# Patient Record
Sex: MALE | Race: WHITE | NOT HISPANIC OR LATINO | Employment: OTHER | URBAN - METROPOLITAN AREA
[De-identification: names, ages, dates, MRNs, and addresses within clinical notes are randomized per-mention and may not be internally consistent; named-entity substitution may affect disease eponyms.]

---

## 2021-07-01 ENCOUNTER — OUTSIDE FACILITY SERVICE (OUTPATIENT)
Dept: CARDIOLOGY | Facility: CLINIC | Age: 61
End: 2021-07-01

## 2021-07-01 PROCEDURE — OUTSIDEPOS PR OUTSIDE POS PLACEHOLDER: Performed by: INTERNAL MEDICINE

## 2023-04-07 ENCOUNTER — OFFICE VISIT (OUTPATIENT)
Dept: VASCULAR SURGERY | Facility: HOSPITAL | Age: 63
End: 2023-04-07
Payer: OTHER GOVERNMENT

## 2023-04-07 VITALS
HEART RATE: 83 BPM | DIASTOLIC BLOOD PRESSURE: 60 MMHG | OXYGEN SATURATION: 96 % | RESPIRATION RATE: 18 BRPM | TEMPERATURE: 97.9 F | SYSTOLIC BLOOD PRESSURE: 92 MMHG

## 2023-04-07 DIAGNOSIS — I65.22 LEFT CAROTID ARTERY STENOSIS: ICD-10-CM

## 2023-04-07 DIAGNOSIS — I65.21 RIGHT CAROTID ARTERY OCCLUSION: Primary | ICD-10-CM

## 2023-04-07 PROCEDURE — G0463 HOSPITAL OUTPT CLINIC VISIT: HCPCS | Performed by: NURSE PRACTITIONER

## 2023-04-07 RX ORDER — ASPIRIN 81 MG/1
81 TABLET ORAL DAILY
COMMUNITY

## 2023-04-07 RX ORDER — LEVOTHYROXINE SODIUM 0.05 MG/1
50 TABLET ORAL DAILY
COMMUNITY

## 2023-04-07 RX ORDER — SIMVASTATIN 40 MG
40 TABLET ORAL DAILY
COMMUNITY

## 2023-04-07 NOTE — PROGRESS NOTES
[facility] Vascular Surgery New Patient Office Note     Date of Encounter: 04/07/2023     MRN Number: [id number]  Name: Miah Mejia  Phone Number: [phone]     Referred By: Stanislav Benitez, *  PCP: Miracle Perez APRN    Chief Complaint:    Chief Complaint   Patient presents with   • Carotid Artery Disease     PATIENT IS A 63 YEAR OLD MALE THAT PRESENTS TO THE CLINIC TO ESTABLISH CARE WITH [facility]. PATIENT WAS SEEN BY [facility], DR. RAMSAY. PATIENT HAS HAD A PROCEDURE PERFORMED BY DR. RAMSAY WITHIN THE LAST TWO OR THREE YEARS. PATIENT STATES THAT DUE TO DISTANCE HE WISHES TO ESTABLISH CARE AT [facility]. PATIENT REPORTS SMOKING 0.5 PPD.        Subjective      History of Present Illness:    Miah Mejia is a 63 y.o. male [facility] and is a previous patient of Dr. Olguin but needs to establish care closer to home. He has an extensive history, according to the study dated in 2021 he has an occluded right internal and external carotid arteries and a history of a left carotid stent. He had a carotid duplex performed at [facility] on 03/08/23. The duplex showed an increased velocity in the distal left CCA.  He denies any signs/symptoms to suggest CVA, TIA or amorous fugax.  No other complaints at this time.    Review of Systems:  ROS  Review of Systems   Constitutional: Negative.   HENT: Negative.    Cardiovascular: Negative.    Respiratory: Negative.    Skin: Negative.    Musculoskeletal: Negative.    Gastrointestinal: Negative.    Neurological: Negative.    Psychiatric/Behavioral: Negative.     I have reviewed the following portions of the patient's history: allergies, current medications, past family history, past medical history, past social history, past surgical history and problem list and confirm it's accurate.    Allergies:  No Known Allergies    Medications:      Current Outpatient Medications:   •  aspirin 81 MG EC tablet, Take 1  tablet by mouth Daily., Disp: , Rfl:   •  levothyroxine (SYNTHROID, LEVOTHROID) 50 MCG tablet, Take 1 tablet by mouth Daily., Disp: , Rfl:   •  simvastatin (ZOCOR) 40 MG tablet, Take 1 tablet by mouth Daily., Disp: , Rfl:     History:   Past Medical History:   Diagnosis Date   • Asthma    • Cancer    • Carotid stenosis, bilateral    • COPD (chronic obstructive pulmonary disease)    • Hypothyroidism    • Myocardial infarction        Past Surgical History:   Procedure Laterality Date   • CARDIAC CATHETERIZATION     • CAROTID STENT         Social History     Socioeconomic History   • Marital status: Single   Tobacco Use   • Smoking status: Every Day     Packs/day: 0.50     Years: 40.00     Pack years: 20.00     Types: Cigarettes   Vaping Use   • Vaping Use: Never used   Substance and Sexual Activity   • Alcohol use: Yes     Comment: RARELY   • Drug use: Never   • Sexual activity: Defer        History reviewed. No pertinent family history.    Objective     Physical Exam:  Vitals:    04/07/23 0828 04/07/23 0830   BP: 92/56 92/60   BP Location: Right arm Left arm   Patient Position: Sitting Sitting   Cuff Size: Large Adult Large Adult   Pulse: 83    Resp: 18    Temp: 97.9 °F (36.6 °C)    TempSrc: Temporal    SpO2: 96%    PainSc:   5    PainLoc: Back       There is no height or weight on file to calculate BMI.    Physical Exam   Physical Exam  Constitutional:       Appearance: Normal appearance.   HENT:      Head: Normocephalic.   Cardiovascular:      Rate and Rhythm: Normal rate.      Pulses: Normal pulses.     Pulmonary:      Effort: Pulmonary effort is normal.   Musculoskeletal:         General: Normal range of motion.      Cervical back: Normal range of motion.   Skin:     General: Skin is warm and dry.      Capillary Refill: Capillary refill takes less than 2 seconds.     Neurological:      General: No focal deficit present.      Mental Status: Alert and oriented to person, place, and time.   Psychiatric:         Mood  and Affect: Mood normal.         Behavior: Behavior normal.  Imaging/Labs:  I have reviewed the images and report from [facility] for a carotid duplex. The duplex reveals stable known occlusion of the right internal and external carotid arteries. The Left distal common carotid artery revealed an increased velocity of 310 cm/sec.  No radiology results for the last 30 days.       Assessment / Plan      Assessment / Plan:  Diagnoses and all orders for this visit:    1. Right carotid artery occlusion (Primary)  -     CT Angiogram Carotids; Future    2. Left carotid artery stenosis  -     CT Angiogram Carotids; Future     Mr. Mejia has a known right internal and external carotid artery occlusion and a left carotid stent with an significant increase in the velocity of the left CCA at 310 cm/sec according to the duplex performed at [facility] on 03/08/23.  I recommend that we obtain a CTA of the carotids and follow up with one of our surgeons. We will schedule accordingly.     I have stressed that should he need a intervention that he may have to return to [place] for treatment because we are not equipped at our facility to treat accordingly, he has had  previous left TCAR. He voices understanding and is in agreement with the plan at this time.       Follow Up:   No follow-ups on file.   Or sooner for any further concerns or worsening sign and symptoms. If unable to reach us in the office please dial 911 or go to the nearest emergency department.      DEWAYNE Almaraz  [facility] Vascular Surgery

## 2023-05-24 ENCOUNTER — TELEPHONE (OUTPATIENT)
Dept: VASCULAR SURGERY | Facility: HOSPITAL | Age: 63
End: 2023-05-24
Payer: OTHER GOVERNMENT

## 2023-05-24 NOTE — TELEPHONE ENCOUNTER
CALLED PT AND INFORMED HIM THAT YUDELKA BUCHANAN HAD REVIEWED HIS TEST FROM [facility] AND CONSULTED DR WADE AND THEY FEEL THAT A 1 YR F/U WITH TEST IS THE BEST COURSE OF ACTION AT THIS TIME. THE PT AGREED AND UNDERSTOOD.

## 2023-10-19 ENCOUNTER — APPOINTMENT (RX ONLY)
Dept: URBAN - METROPOLITAN AREA CLINIC 88 | Facility: CLINIC | Age: 63
Setting detail: DERMATOLOGY
End: 2023-10-19

## 2023-10-19 DIAGNOSIS — D485 NEOPLASM OF UNCERTAIN BEHAVIOR OF SKIN: ICD-10-CM

## 2023-10-19 DIAGNOSIS — L73.8 OTHER SPECIFIED FOLLICULAR DISORDERS: ICD-10-CM | Status: STABLE

## 2023-10-19 PROBLEM — D48.5 NEOPLASM OF UNCERTAIN BEHAVIOR OF SKIN: Status: ACTIVE | Noted: 2023-10-19

## 2023-10-19 PROCEDURE — ? BIOPSY BY SHAVE METHOD

## 2023-10-19 PROCEDURE — ? COUNSELING

## 2023-10-19 PROCEDURE — 11102 TANGNTL BX SKIN SINGLE LES: CPT

## 2023-10-19 PROCEDURE — 99202 OFFICE O/P NEW SF 15 MIN: CPT | Mod: 25

## 2023-10-19 ASSESSMENT — LOCATION SIMPLE DESCRIPTION DERM
LOCATION SIMPLE: LEFT FOREHEAD
LOCATION SIMPLE: LEFT ZYGOMA
LOCATION SIMPLE: LEFT CHEEK

## 2023-10-19 ASSESSMENT — LOCATION DETAILED DESCRIPTION DERM
LOCATION DETAILED: LEFT SUPERIOR FOREHEAD
LOCATION DETAILED: LEFT INFERIOR LATERAL MALAR CHEEK
LOCATION DETAILED: LEFT LATERAL ZYGOMA
LOCATION DETAILED: LEFT INFERIOR CENTRAL MALAR CHEEK

## 2023-10-19 ASSESSMENT — LOCATION ZONE DERM: LOCATION ZONE: FACE

## 2023-10-19 NOTE — PROCEDURE: MIPS QUALITY
Quality 110: Preventive Care And Screening: Influenza Immunization: Influenza Immunization not Administered for Documented Reasons.
Detail Level: Detailed
Additional Notes: Patient did not get flu shot

## 2025-05-05 ENCOUNTER — OFFICE VISIT (OUTPATIENT)
Dept: CARDIOLOGY | Facility: CLINIC | Age: 65
End: 2025-05-05
Payer: OTHER GOVERNMENT

## 2025-05-05 VITALS
WEIGHT: 206 LBS | DIASTOLIC BLOOD PRESSURE: 46 MMHG | SYSTOLIC BLOOD PRESSURE: 108 MMHG | HEIGHT: 71 IN | BODY MASS INDEX: 28.84 KG/M2 | HEART RATE: 84 BPM

## 2025-05-05 DIAGNOSIS — I25.10 CORONARY ARTERY DISEASE INVOLVING NATIVE CORONARY ARTERY OF NATIVE HEART WITHOUT ANGINA PECTORIS: ICD-10-CM

## 2025-05-05 DIAGNOSIS — E78.2 HYPERLIPEMIA, MIXED: ICD-10-CM

## 2025-05-05 DIAGNOSIS — I65.22 CAROTID STENOSIS, ASYMPTOMATIC, LEFT: ICD-10-CM

## 2025-05-05 DIAGNOSIS — I10 HYPERTENSION, ESSENTIAL: ICD-10-CM

## 2025-05-05 DIAGNOSIS — R55 SYNCOPE, UNSPECIFIED SYNCOPE TYPE: Primary | ICD-10-CM

## 2025-05-05 PROCEDURE — 93000 ELECTROCARDIOGRAM COMPLETE: CPT | Performed by: SPECIALIST

## 2025-05-05 PROCEDURE — 99204 OFFICE O/P NEW MOD 45 MIN: CPT | Performed by: SPECIALIST

## 2025-05-05 RX ORDER — ATORVASTATIN CALCIUM 40 MG/1
40 TABLET, FILM COATED ORAL DAILY
COMMUNITY

## 2025-05-05 RX ORDER — FENOFIBRATE 48 MG/1
48 TABLET, FILM COATED ORAL DAILY
COMMUNITY

## 2025-05-05 RX ORDER — FLUTICASONE PROPIONATE 44 UG/1
1 AEROSOL, METERED RESPIRATORY (INHALATION)
COMMUNITY

## 2025-05-05 RX ORDER — MULTIVIT WITH MINERALS/LUTEIN
500 TABLET ORAL DAILY
COMMUNITY

## 2025-05-05 RX ORDER — ALBUTEROL SULFATE 90 UG/1
2 INHALANT RESPIRATORY (INHALATION) EVERY 4 HOURS PRN
COMMUNITY

## 2025-05-05 RX ORDER — LEVOTHYROXINE SODIUM 88 UG/1
88 TABLET ORAL
COMMUNITY

## 2025-05-05 RX ORDER — PREDNISONE 10 MG/1
TABLET ORAL
COMMUNITY
Start: 2025-03-13

## 2025-05-05 NOTE — PROGRESS NOTES
[facility]   Cardiology Consult Note    Patient Name: Miah Mejia  : 1960  Referring Physician: Niru Matthews, *  Subjective   Subjective     Reason for Consult/ Chief Complaint:   Chief Complaint   Patient presents with   • Coronary Artery Disease     With stents and carotid artery stent   • Establish Care       HPI:  iMah Mejia is a 65 y.o. male with previous history of coronary artery status post stents, status post carotid stent with bilateral carotid stenosis.  Has been having some orthostatic hypotension with syncopal spells on and off for the last few months.  No chest pain or angina.  No shortness of breath.  No palpitations.    Review of Systems:    Constitutional no fever,  no weight loss   Skin no rash   Otolaryngeal no difficulty swallowing   Cardiovascular See HPI   Pulmonary no cough, no sputum production   Gastrointestinal no constipation, no diarrhea   Genitourinary no dysuria, no hematuria   Hematologic no easy bruisability, no abnormal bleeding   Musculoskeletal no muscle pain   Neurologic no dizziness, no falls       Personal History     Past Medical History:  Past Medical History:   Diagnosis Date   • Asthma    • Cancer    • Carotid stenosis, bilateral    • COPD (chronic obstructive pulmonary disease)    • Hyperlipidemia    • Hypothyroidism    • Myocardial infarction        Family History:   Family History   Problem Relation Age of Onset   • Cancer Mother    • Heart disease Father        Social History:  reports that he has been smoking cigarettes. He has a 20 pack-year smoking history. He has never used smokeless tobacco. He reports that he does not drink alcohol and does not use drugs.    Home Medications:  albuterol sulfate HFA, aspirin, atorvastatin, fenofibrate, fluticasone, levothyroxine, predniSONE, simvastatin, and vitamin C    Allergies:  Allergies   Allergen Reactions   • Fish-Derived Products Hives       Objective    Objective     Vitals:   Heart Rate:   [84] 84  BP: (108)/(46) 108/46  Body mass index is 29.14 kg/m².  PHYSICAL EXAM:    General Appearance:   well developed  well nourished  HENT:   oropharynx moist  lips not cyanotic  Neck:  thyroid not enlarged  supple  Respiratory:  no respiratory distress  normal breath sounds  no rales  Cardiovascular:  no jugular venous distention  regular rhythm  apical impulse normal  S1 normal, S2 normal  no S3, no S4   no murmur  no rub, no thrill  carotid pulses normal; no bruit  pedal pulses normal  lower extremity edema: none    Skin:   warm, dry  Psychiatric:  judgement and insight appropriate  normal mood and affect    RESULTS:    EKG reviewed by me and shows sinus rhythm       Result Review    Result Review:  I have personally reviewed the available results:  [x]  Laboratory  [x]  EKG/Telemetry   [x]  Cardiology/Vascular   [x] Medications  [x]  Old records        ECG 12 Lead    Date/Time: 5/5/2025 11:25 AM  Performed by: Thoams Sierra MD    Authorized by: Thomas Sierra MD  Comparison: compared with previous ECG   Rhythm: sinus rhythm  Rate: normal  QRS axis: normal  Other findings: non-specific ST-T wave changes  Comments: Normal sinus rhythm.  No significant changes compared to previous EKG           Impression/Plan  1. Syncope probably secondary to orthostatic hypotension: Monitor blood pressure regularly.  Lifestyle modification, increasing fluid intake, compression stocking advised.  If still continues to have symptoms we will start him on ProAmatine.  2.  Coronary artery disease status post stent: Echocardiogram.  Sestamibi stress test to evaluate any ischemia.  Continue aspirin 81 mg once a day.  3.  Hypertension: Stable.  Monitor blood pressure regularly.  3.  Mixed hyperlipidemia: Continue Lipitor 40 mg once a day.  Continue fenofibrate 48 mg once a day.  Monitor lipid and hepatic profile.      Electronically signed by Thomas Sierra MD, 05/05/25, 11:09 AM EDT.

## 2025-05-19 ENCOUNTER — TELEPHONE (OUTPATIENT)
Dept: CARDIOLOGY | Facility: CLINIC | Age: 65
End: 2025-05-19
Payer: OTHER GOVERNMENT

## 2025-05-19 NOTE — TELEPHONE ENCOUNTER
Caller: ELVI    Adrien call back number: [phone]     What form or medical record are you requesting: PROGRESS NOTE 05.05.25    Who is requesting this form or medical record from you: [employer]    How would you like to receive the form or medical records (pick-up, mail, fax): FAX  If fax, what is the fax number: [phone]    Timeframe paperwork needed: ASAP

## 2025-05-30 ENCOUNTER — TELEPHONE (OUTPATIENT)
Dept: CARDIOLOGY | Facility: CLINIC | Age: 65
End: 2025-05-30
Payer: OTHER GOVERNMENT

## 2025-05-30 DIAGNOSIS — I65.22 CAROTID STENOSIS, ASYMPTOMATIC, LEFT: ICD-10-CM

## 2025-05-30 DIAGNOSIS — I65.22 CAROTID STENOSIS, ASYMPTOMATIC, LEFT: Primary | ICD-10-CM

## 2025-05-30 NOTE — TELEPHONE ENCOUNTER
Received call from [facility] with critical carotid ultrasound results. Reports no flow in CCA or ICA on either side. Only flow is in ECA and vertebral on both sides. To be read stat. Patient does not have any different symptoms at this time.     Dr. Sierra notified of above. Per Dr. Sierra place referral for vascular for patient to be seen next week. Dr. Hamm RN notified of above as well. Referral order placed.

## 2025-05-30 NOTE — TELEPHONE ENCOUNTER
Received call from radiologist from [facility]. Reports both carotid arteries are occluded. Requested records to be faxed ASAP.

## 2025-06-02 ENCOUNTER — RESULTS FOLLOW-UP (OUTPATIENT)
Dept: CARDIOLOGY | Facility: CLINIC | Age: 65
End: 2025-06-02
Payer: OTHER GOVERNMENT

## 2025-06-02 DIAGNOSIS — I65.22 CAROTID STENOSIS, ASYMPTOMATIC, LEFT: Primary | ICD-10-CM

## 2025-06-02 NOTE — TELEPHONE ENCOUNTER
SW patient regarding results and recommendations. Voiced understanding. Vascular referral placed previously.   Vascular RN notified that patient is agreeable for appointment. Vascular to speak with Dr. Hamm and contact patient.

## 2025-06-02 NOTE — TELEPHONE ENCOUNTER
Richmond Kingsley RN spoke with Dr. Hamm:     Per Dr. Hamm We will await the results of his CT scan. Once they become available, we will determine if we can see him or if he is a [facility] candidate. However, the thing about total occlusion of the carotid, once it is 100% blocked... there is nothing anyone can do. To repair a totally occluded carotid artery risks death because that part of the brain has been without blood flowing through that artery can risk death, seizures, and swelling.

## 2025-06-03 ENCOUNTER — HOSPITAL ENCOUNTER (OUTPATIENT)
Dept: CT IMAGING | Facility: HOSPITAL | Age: 65
Discharge: HOME | End: 2025-06-03
Admitting: NURSE PRACTITIONER
Payer: OTHER GOVERNMENT

## 2025-06-03 ENCOUNTER — RESULTS FOLLOW-UP (OUTPATIENT)
Dept: CARDIOLOGY | Facility: CLINIC | Age: 65
End: 2025-06-03
Payer: OTHER GOVERNMENT

## 2025-06-03 DIAGNOSIS — I25.10 CORONARY ARTERY DISEASE INVOLVING NATIVE CORONARY ARTERY OF NATIVE HEART WITHOUT ANGINA PECTORIS: ICD-10-CM

## 2025-06-03 DIAGNOSIS — I65.23 CAROTID STENOSIS, BILATERAL: ICD-10-CM

## 2025-06-03 DIAGNOSIS — I65.22 CAROTID STENOSIS, ASYMPTOMATIC, LEFT: ICD-10-CM

## 2025-06-03 DIAGNOSIS — R55 SYNCOPE, UNSPECIFIED SYNCOPE TYPE: Primary | ICD-10-CM

## 2025-06-03 DIAGNOSIS — R55 SYNCOPE, UNSPECIFIED SYNCOPE TYPE: ICD-10-CM

## 2025-06-03 PROCEDURE — 25510000001 IOPAMIDOL PER 1 ML: Performed by: NURSE PRACTITIONER

## 2025-06-03 PROCEDURE — 70498 CT ANGIOGRAPHY NECK: CPT

## 2025-06-03 RX ORDER — IOPAMIDOL 755 MG/ML
100 INJECTION, SOLUTION INTRAVASCULAR
Status: COMPLETED | OUTPATIENT
Start: 2025-06-03 | End: 2025-06-03

## 2025-06-03 RX ADMIN — IOPAMIDOL 100 ML: 755 INJECTION, SOLUTION INTRAVENOUS at 13:34

## 2025-06-03 NOTE — TELEPHONE ENCOUNTER
AKIN patient regarding results and recommendations. Voiced understanding.     Patient has been stented previously by Dr Greer. Patient agreeable going back to see him

## 2025-06-03 NOTE — TELEPHONE ENCOUNTER
AKIN Weiss with Dr Greer office. Apt for 6/12 AT 1215. Call placed to PCP office to get referral sent due to being [facility]. Staff said they would get sent urgently     CTA carotid and OV note faxed.

## 2025-06-05 NOTE — TELEPHONE ENCOUNTER
Contacted patient PCP office to follow up on urgent referral need due to [facility] insurance. Staff reports they are sending an urgent message to the provider. Referral order and associated testing faxed per request.

## 2025-06-10 DIAGNOSIS — I25.10 CORONARY ARTERY DISEASE INVOLVING NATIVE CORONARY ARTERY OF NATIVE HEART WITHOUT ANGINA PECTORIS: ICD-10-CM

## 2025-06-10 DIAGNOSIS — R55 SYNCOPE, UNSPECIFIED SYNCOPE TYPE: ICD-10-CM

## 2025-06-11 NOTE — PROGRESS NOTES
[facility]  Cardiology progress Note    Patient Name: Miah Mejia  : 1960    CHIEF COMPLAINT  CORONARY ARTERY DISEASE        Subjective   Subjective     HISTORY OF PRESENT ILLNESS    Miah Mejia is a 65 y.o. male with CAD.  No chest pain.    REVIEW OF SYSTEMS    Constitutional:    No fever, no weight loss  Skin:     No rash  Otolaryngeal:    No difficulty swallowing  Cardiovascular: See HPI.  Pulmonary:    No cough, no sputum production    Personal History     Social History:    reports that he has been smoking cigarettes. He has a 20 pack-year smoking history. He has never used smokeless tobacco. He reports that he does not drink alcohol and does not use drugs.    Home Medications:  Current Outpatient Medications on File Prior to Visit   Medication Sig    albuterol sulfate  (90 Base) MCG/ACT inhaler Inhale 2 puffs Every 4 (Four) Hours As Needed for Wheezing.    aspirin 81 MG EC tablet Take 1 tablet by mouth Daily.    atorvastatin (LIPITOR) 40 MG tablet Take 1 tablet by mouth Daily.    fenofibrate (TRICOR) 48 MG tablet Take 1 tablet by mouth Daily.    fluticasone (FLOVENT HFA) 44 MCG/ACT inhaler Inhale 1 puff 2 (Two) Times a Day.    levothyroxine (SYNTHROID, LEVOTHROID) 88 MCG tablet Take 1 tablet by mouth Every Morning.    predniSONE (DELTASONE) 10 MG tablet TAKE 1 TABLET BY MOUTH EVERY DAY WITH FOOD OR MILK FOR 7 DAYS    vitamin C (ASCORBIC ACID) 250 MG tablet Take 2 tablets by mouth Daily.     No current facility-administered medications on file prior to visit.       Past Medical History:   Diagnosis Date    Asthma     Cancer     Carotid stenosis, bilateral     COPD (chronic obstructive pulmonary disease)     Hyperlipidemia     Hypothyroidism     Myocardial infarction        Allergies:  Allergies   Allergen Reactions    Fish-Derived Products Hives and Anaphylaxis    Fish Oil Unknown - High Severity    Flavoring Agent Unknown - High Severity       Objective    Objective        Vitals:   Heart Rate:  [83] 83  BP: (109)/(74) 109/74  Body mass index is 30.5 kg/m².     PHYSICAL EXAM:    General Appearance:   well developed  well nourished  HENT:   oropharynx moist  lips not cyanotic  Neck:  thyroid not enlarged  supple  Respiratory:  no respiratory distress  normal breath sounds  no rales  Cardiovascular:  no jugular venous distention  regular rhythm  apical impulse normal  S1 normal, S2 normal  no S3, no S4   no murmur  no rub, no thrill  carotid pulses normal; no bruit  pedal pulses normal  lower extremity edema: none    Skin:   warm, dry  Psychiatric:  judgement and insight appropriate  normal mood and affect        Result Review:  I have personally reviewed the available results from  [x]  Laboratory  [x]  EKG  [x]  Cardiology  [x]  Medications  [x]  Old records  []  Other:     Procedures    Results for orders placed in visit on 06/03/25    Adult Transthoracic Echo Complete W/ Cont if Necessary Per Protocol     Impression/Plan:  1. Syncope probably secondary to orthostatic hypotension: Feels better.  Stress test and cardiac workup was negative.  Still has some occasional near syncopal spells.  Start ProAmatine 5 mg twice a day.  Monitor blood pressure regularly.  Lifestyle modification, increasing fluid intake, compression stocking advised.    2.  Coronary artery disease status post stent: Stress test negative.  Continue aspirin 81 mg once a day.  3.  Hypertension: Stable.  Monitor blood pressure regularly.  3.  Mixed hyperlipidemia: Continue Lipitor 40 mg once a day.  Continue fenofibrate 48 mg once a day.  Monitor lipid and hepatic profile.                 Thomas Sierra MD   06/16/25   11:20 EDT

## 2025-06-16 ENCOUNTER — OFFICE VISIT (OUTPATIENT)
Dept: CARDIOLOGY | Facility: CLINIC | Age: 65
End: 2025-06-16
Payer: OTHER GOVERNMENT

## 2025-06-16 VITALS
DIASTOLIC BLOOD PRESSURE: 74 MMHG | BODY MASS INDEX: 30.43 KG/M2 | HEART RATE: 83 BPM | HEIGHT: 70 IN | SYSTOLIC BLOOD PRESSURE: 109 MMHG | WEIGHT: 212.6 LBS

## 2025-06-16 DIAGNOSIS — I25.10 CORONARY ARTERY DISEASE INVOLVING NATIVE CORONARY ARTERY OF NATIVE HEART WITHOUT ANGINA PECTORIS: Primary | ICD-10-CM

## 2025-06-16 DIAGNOSIS — I10 HYPERTENSION, ESSENTIAL: ICD-10-CM

## 2025-06-16 DIAGNOSIS — E78.2 HYPERLIPEMIA, MIXED: ICD-10-CM

## 2025-06-16 PROCEDURE — 99214 OFFICE O/P EST MOD 30 MIN: CPT | Performed by: SPECIALIST

## 2025-06-16 RX ORDER — MIDODRINE HYDROCHLORIDE 5 MG/1
5 TABLET ORAL 2 TIMES DAILY
Qty: 180 TABLET | Refills: 3 | Status: SHIPPED | OUTPATIENT
Start: 2025-06-16